# Patient Record
(demographics unavailable — no encounter records)

---

## 2024-10-10 NOTE — DISCUSSION/SUMMARY
[Patient] : the patient [___ Month(s)] : in [unfilled] month(s) [FreeTextEntry2] : adrian [FreeTextEntry1] : Continue with monthly remote monitoring.

## 2024-10-10 NOTE — REASON FOR VISIT
[Arrhythmia/ECG Abnorrmalities] : arrhythmia/ECG abnormalities [FreeTextEntry1] : Pt presents for 6 month routine ILR interrogation

## 2024-10-10 NOTE — ASSESSMENT
[FreeTextEntry1] : She is in normal sinus rhythm today. There is 1 false pause due to undersensing that was previously seen on remote monitoring in 8/24.   Multiple episodes of false AF/AT which are SR with APCs.  She does not have any symptoms when she does have PAF. She is maintained on Eliquis and Toprol.

## 2024-10-10 NOTE — HISTORY OF PRESENT ILLNESS
[FreeTextEntry1] : This is a 96 yr old female with PMHx of PAF, syncope, on Eliquis who is s/p ILR placement who denies any c/o symptoms.  She is here with her grandson and she reports feeling well.

## 2025-07-28 NOTE — REVIEW OF SYSTEMS
[SOB] : no shortness of breath [Palpitations] : no palpitations [Syncope] : no syncope [Dizziness] : no dizziness [Easy Bleeding] : no tendency for easy bleeding [Easy Bruising] : no tendency for easy bruising [Negative] : Gastrointestinal

## 2025-07-28 NOTE — ASSESSMENT
[FreeTextEntry1] : Paroxysmal atrial fibrillation - patient has asymptomatic paroxysmal atrial fibrillation. The finding of a 6 seconds conversion pause is concerning as this will very likely lead to syncope in other scenarios. The risks and benefits of a permanent pacemaker were reviewed with Ms. Stanton. I urged Ms. Lopezshellie to take the time she needs to decide if a pacemaker will be in her best interest. She is a candidate for a leadless pacemaker which will be of the least risk with respect to infection.

## 2025-07-28 NOTE — REASON FOR VISIT
[Arrhythmia/ECG Abnorrmalities] : arrhythmia/ECG abnormalities [Other: _____] : [unfilled] [FreeTextEntry1] : Reason for visit: paroxysmal atrial fibrillation

## 2025-07-28 NOTE — PHYSICAL EXAM
[Well Developed] : well developed [No Acute Distress] : no acute distress [Normal Venous Pressure] : normal venous pressure [Normal S1, S2] : normal S1, S2 [No Murmur] : no murmur [Clear Lung Fields] : clear lung fields [Good Air Entry] : good air entry [No Respiratory Distress] : no respiratory distress  [Soft] : abdomen soft [Normal Bowel Sounds] : normal bowel sounds [No Edema] : no edema [No Focal Deficits] : no focal deficits [Alert and Oriented] : alert and oriented [de-identified] : Regular rhythm with normal rate.

## 2025-07-28 NOTE — HISTORY OF PRESENT ILLNESS
[FreeTextEntry1] : Ms. Stanton is a patient with history of paroxysmal atrial fibrillation who has returned for a follow up visit. Patient has an implantable loop recorder in place. The device documented one 10 hours long episode of atrial fibrillation on 7/24 which converted some time around 7:34 AM. There was a 6 seconds pause(both atrial and ventricular). Patient was asleep at the time. She believes there is a correlation between alcohol consumption and her atrial fibrillation. Ms. Stanton otherwise denies symptom of light headed sensation or syncope.